# Patient Record
Sex: MALE | Race: WHITE | NOT HISPANIC OR LATINO
[De-identification: names, ages, dates, MRNs, and addresses within clinical notes are randomized per-mention and may not be internally consistent; named-entity substitution may affect disease eponyms.]

---

## 2019-09-04 ENCOUNTER — APPOINTMENT (OUTPATIENT)
Dept: ORTHOPEDIC SURGERY | Facility: CLINIC | Age: 54
End: 2019-09-04
Payer: COMMERCIAL

## 2019-09-04 VITALS — HEIGHT: 68 IN | BODY MASS INDEX: 32.13 KG/M2 | WEIGHT: 212 LBS

## 2019-09-04 DIAGNOSIS — M16.11 UNILATERAL PRIMARY OSTEOARTHRITIS, RIGHT HIP: ICD-10-CM

## 2019-09-04 DIAGNOSIS — M17.0 BILATERAL PRIMARY OSTEOARTHRITIS OF KNEE: ICD-10-CM

## 2019-09-04 PROBLEM — Z00.00 ENCOUNTER FOR PREVENTIVE HEALTH EXAMINATION: Status: ACTIVE | Noted: 2019-09-04

## 2019-09-04 PROCEDURE — 99204 OFFICE O/P NEW MOD 45 MIN: CPT

## 2019-09-04 PROCEDURE — 73562 X-RAY EXAM OF KNEE 3: CPT | Mod: 50

## 2019-09-04 PROCEDURE — 73521 X-RAY EXAM HIPS BI 2 VIEWS: CPT

## 2019-09-04 NOTE — PHYSICAL EXAM
[de-identified] : Patient's right knee has full range of motion there is no warmth no effusion noted. Neurovascular intact distally.\par \par Right hip on exam today has 5° of internal and external rotation only in the attempt motion beyond this causes discomfort. Patient is neurovascularly intact possible 5-10° flexion contractureShe has minimal 3-4 mm right shorter than left leg length discrepancy [de-identified] : AP standing individual lateral and sunrise views of both knees were obtained showing well preserved joint spaces in all 3 compartments of each knee.\par \par AP and lateral of both hips were obtained showing severe arthritis of the right hip

## 2019-09-04 NOTE — HISTORY OF PRESENT ILLNESS
[de-identified] : HERE FOR PAIN ON RIGHT HIP AND ALSO RIGHT KNEE. \par \par HAS NO RECENT ACCIDENTS OR FALLS. DIFFICULTY STANDING LONG PERIODS AND BEARING WEIGHT.

## 2019-09-04 NOTE — DISCUSSION/SUMMARY
[Surgical risks reviewed] : Surgical risks reviewed [de-identified] : Regional risks and benefits of right hip replacement surgery were discussed in detail patient asked appropriate questions are answered to his satisfaction we'll try to accommodate him pending medicalclearance.